# Patient Record
Sex: FEMALE | Race: WHITE | HISPANIC OR LATINO | Employment: UNEMPLOYED | URBAN - METROPOLITAN AREA
[De-identification: names, ages, dates, MRNs, and addresses within clinical notes are randomized per-mention and may not be internally consistent; named-entity substitution may affect disease eponyms.]

---

## 2018-01-10 NOTE — RESULT NOTES
Verified Results  * XR FOOT 3+ VIEW LEFT [id number] 11:57LG Kaycee Big Order Number: [id number]     Test Name Result Flag Reference   XR FOOT 3+ VW LEFT (Report)     LEFT FOOT     INDICATION: Hit left foot on bench 1 1/2 months ago  Pain in 5th toe radiating  COMPARISON: None     VIEWS: 3; 3 images     FINDINGS:     There is a transverse fracture of the distal aspect of the proximal phalanx of the 5th digit  There is some callus formation adjacent to the fracture, suggesting subacute  No degenerative changes  No lytic or blastic lesions are seen  Soft tissues are unremarkable  IMPRESSION:     Healing transverse fracture of the distal aspect of the proximal phalanx of the 5th digit  ##imslh##imslh       Workstation performed: [id number]     Signed by:   Haylee Snyder MD   7/1/16       Discussion/Summary   xray does show evidence of prior 5th toe fracture  I would refer to Dr Mariia Mercer for evaluation  She may need to be in a walking boot for several weeks if pain continues

## 2018-01-11 NOTE — RESULT NOTES
Verified Results  * US ABDOMEN COMPLETE [id number] 40:55HK Chayito Cagle Order Number: [id number]    - Patient Instructions: To schedule this appointment, please contact Central Scheduling at [phone]  Test Name Result Flag Reference   US ABDOMEN COMPLETE (Report)     ABDOMEN ULTRASOUND, COMPLETE     INDICATION: 62-year-old woman with history of lymphoma and generalized abdominal pain  COMPARISON: CT of the chest, abdomen and pelvis from July 31, 2015  TECHNIQUE: Real-time ultrasound of the abdomen was performed with a curvilinear transducer with both volumetric sweeps and still imaging techniques  FINDINGS:     PANCREAS: Normal with no mass or fluid collection  AORTA AND IVC: Normal for patient's age  No aortic aneurysm  Circumferential soft tissue, 2 2 cm in diameter, surrounding a patent superior mesenteric artery, similar in appearance to last year's CT and presumably residual, treated lymphoma  LIVER:   Size: Normal  The liver measures 14 5 cm in the midclavicular line  Contour: Surface contour smooth  Parenchyma: Echogenicity and echotexture within normal limits  No evidence of mass  Main portal vein patent with hepatopetal flow  GALLBLADDER: No calculi  No pericholecystic fluid    Gallbladder wall normal in thickness, measuring 2 mm  Sonographer reports no sonographic Kendall's sign  BILE DUCTS: CBD normal in caliber, measuring 5 mm  No intra-hepatic bile duct dilatation  KIDNEYS:    Right kidney measures 11 7 x 5 5 cm  Cortex normal in thickness and echogenicity  No masses  No calculus or hydronephrosis  Left kidney measures 10 3 x 5 2 cm  Cortex normal in thickness and echogenicity  No masses  No calculus or hydronephrosis  SPLEEN:    Normal, measuring 9 1 cm in length  Normal echogenicity  No mass or perisplenic collection  ASCITES: None         IMPRESSION:     Soft tissue structure surrounding the proximal portion of the SMA, most likely treated lymphoma, unchanged since a CT the abdomen and pelvis from July 31, 2015  Otherwise normal complete abdominal sonogram        Workstation performed: [id number]     Signed by:   Anshul Mcduffie MD   8/15/16     * US PELVIS COMPLETE [facility] AND TRANSVAGINAL) [id number] 43:75WM [facility] Order Number: [id number]    - Patient Instructions: To schedule this appointment, please contact Central Scheduling at [phone]  Test Name Result Flag Reference   US PELVIS COMPLETE (TRANSABDOMINAL AND TRANSVAGINAL) (Report)     PELVIC ULTRASOUND, COMPLETE     INDICATION: 61-year-old postmenopausal woman with generalized abdominal pain and history of lymphoma  COMPARISON: CT chest, abdomen and pelvis from July 31, 2015  TECHNIQUE:  Transabdominal pelvic ultrasound was performed in sagittal and transverse planes with a curvilinear transducer  Additional transvaginal imaging was performed to better evaluate the endometrium and ovaries  Imaging included volumetric    sweeps as well as traditional still imaging technique  FINDINGS:     UTERUS:   Position: Anteverted and anteflexed  Size: 8 3 x 3 1 x 4 1 cm  Echotexture: Normal contour and echogenicity  8 x 6 mm hypoechoic round avascular structure at the junction of the lower uterine segment and cervix, either an submucosal leiomyoma or a nabothian cyst with debris within it  Cervix: As above  Otherwise normal      ENDOMETRIUM:    Thickness: Normal, measuring 3 mm in maximal thickness  Echotexture: Normal and homogenous in echogenicity with no evidence of endometrial mass or fluid collection  OVARIES/ADNEXA:   Right ovary: 2 3 x 0 9 x 1 5 cm  No suspicious right ovarian abnormality  Doppler flow within normal limits  Left ovary: 1 2 x 2 2 x 1 0 cm  No suspicious left ovarian abnormality  Doppler flow within normal limits  No suspicious adnexal mass  Free fluid: None  IMPRESSION:      6 x 8 mm hypoechoic structure at the junction of the lower uterine segment and cervix, either a small submucosal leiomyoma or a prominent debris-containing nabothian cyst      Otherwise normal pelvic sonogram           Workstation performed: [id number]     Signed by:   Fernandez Mayers MD   8/15/16       Discussion/Summary   No suspicious findings on recent abdominal and pelvic ultrasound

## 2018-01-11 NOTE — MISCELLANEOUS
Message  patient called  wants CT ordered since PET scan denied by insurance  d/w dr Jn Huff and CT neck and chest ordered      Active Problems    1  Abdominal pain (789 00) (R10 9)   2  Acute Tear Of Left Rotator Cuff Tendon (726 19)   3  Aftercare following surgery for injury or trauma (V58 43) (Z48 89)   4  Anxiety (300 00) (F41 9)   5  Complete tear of left rotator cuff (727 61) (M75 122)   6  Encounter for routine gynecological examination (V72 31) (Z01 419)   7  Fatigue (780 79) (R53 83)   8  Foot pain (729 5) (M79 673)   9  Gastroenteritis (558 9) (K52 9)   10  Headache (784 0) (R51)   11  Hyperlipidemia (272 4) (E78 5)   12  Hypertension (401 9) (I10)   13  Impaired fasting glucose (790 21) (R73 01)   14  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)   15  Nausea with vomiting (787 01) (R11 2)   16  Neck pain (723 1) (M54 2)   17  Paresthesia (782 0) (R20 2)   18  Pleural Effusion (511 9)   19  Shoulder joint pain, unspecified laterality   20  Skin lesion (709 9) (L98 9)   21  Tinea corporis (110 5) (B35 4)   22  Visit for pre-operative examination (V72 84) (Z01 818)   23  Visit For: Routine Eye Exam (V72 0)   24  Vulvar Itching Or Burning (625 8)    Current Meds   1  Fish Oil 1000 MG Oral Capsule; Take 1 caps BID; Therapy: [id number] to Recorded   2  Multi-Vitamin Oral Tablet; TAKE 1 TABLET DAILY; Therapy: (Recorded:29Jun2016) to Recorded   3  OneTouch Test STRP; TEST ONCE DAILY; Therapy: (Mary Jane Richard) to Recorded   4  OneTouch UltraSoft Lancets Miscellaneous; TEST BLOOD SUGAR ONCE DAILY; Therapy: (Recorded:28Jan2014) to Recorded    Allergies    1  Corticosteroids    Plan  Lymphoma of lymph nodes of multiple sites    · CT NECK AND CHEST W CONTRAST; Status:Need Information - Financial  Authorization,Retrospective By Protocol Authorization;  Requested for:[id number];     Signatures   Electronically signed by : Marcelo Sterling, ; Sep 22 2016  2:06PM EST                       (Author)

## 2018-01-11 NOTE — MISCELLANEOUS
Message  pet scan denied by insurance  Dr Nicolás Rivera did a peer to peer today  appeal denied  left message to discuss with patient      Active Problems    1  Abdominal pain (789 00) (R10 9)   2  Acute Tear Of Left Rotator Cuff Tendon (726 19)   3  Aftercare following surgery for injury or trauma (V58 43) (Z48 89)   4  Anxiety (300 00) (F41 9)   5  Complete tear of left rotator cuff (727 61) (M75 122)   6  Encounter for routine gynecological examination (V72 31) (Z01 419)   7  Fatigue (780 79) (R53 83)   8  Foot pain (729 5) (M79 673)   9  Gastroenteritis (558 9) (K52 9)   10  Headache (784 0) (R51)   11  Hyperlipidemia (272 4) (E78 5)   12  Hypertension (401 9) (I10)   13  Impaired fasting glucose (790 21) (R73 01)   14  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)   15  Nausea with vomiting (787 01) (R11 2)   16  Neck pain (723 1) (M54 2)   17  Paresthesia (782 0) (R20 2)   18  Pleural Effusion (511 9)   19  Shoulder joint pain, unspecified laterality   20  Skin lesion (709 9) (L98 9)   21  Tinea corporis (110 5) (B35 4)   22  Visit for pre-operative examination (V72 84) (Z01 818)   23  Visit For: Routine Eye Exam (V72 0)   24  Vulvar Itching Or Burning (625 8)    Current Meds   1  Fish Oil 1000 MG Oral Capsule; Take 1 caps BID; Therapy: 15WMH3453 to Recorded   2  Multi-Vitamin Oral Tablet; TAKE 1 TABLET DAILY; Therapy: (Recorded:29Jun2016) to Recorded   3  OneTouch Test STRP; TEST ONCE DAILY; Therapy: (Author Rachel) to Recorded   4  OneTouch UltraSoft Lancets Miscellaneous; TEST BLOOD SUGAR ONCE DAILY; Therapy: (Recorded:28Jan2014) to Recorded    Allergies    1   Corticosteroids    Signatures   Electronically signed by : Viviana Underwood, ; Sep 15 2016 10:33AM EST                       (Author)

## 2018-01-12 NOTE — PROGRESS NOTES
Assessment    1  Foot pain (729 5) (M79 673)   2  Fatigue (780 79) (R53 83)   3  Hypertension (401 9) (I10)   4  Impaired fasting glucose (790 21) (R73 01)   5  Paresthesia (782 0) (R20 2)    Plan  Fatigue, Hypertension, Impaired fasting glucose    · (1) COMPREHENSIVE METABOLIC PANEL; Status:Active; Requested XPI:[id number];    · (1) HEMOGLOBIN A1C; Status:Active; Requested VEY:24BMP8577;    · (1) LIPID PANEL, FASTING; Status:Active; Requested TPU:[id number];    · (1) TSH; Status:Active; Requested DLS:38KTK3096;    · (1) VITAMIN B12; Status:Active; Requested PYX:[id number];    · (1) VITAMIN D 25-HYDROXY; Status:Active; Requested YXD:[id number]; Foot pain    · * XR FOOT 3+ VIEW LEFT; Status:Active; Requested GBS:03NOY3852; Health Maintenance    · * MAMMO SCREENING BILATERAL W CAD; Status:Hold For - Scheduling; Requested  IMZ:[id number];    · *VB-Depression Screening; Status:Resulted - Requires Verification,Retrospective By  Protocol Authorization;   Done: 22NIA2686 09:49AM   · Urine Dip Non-Automated- POC; Status:Resulted - Requires Verification,Retrospective  By Protocol Authorization;   Done: 88GVX3858 09:21AM    Discussion/Summary    Paresthesias of feet  Patient will obtain blood work as ordered to rule out other etiologies of her symptoms  She refused any treatment at this time such as gabapentin  Left toe pain  Patient will obtain x-ray of left foot for further evaluation  Health maintenance  Patient will schedule for colonoscopy, mammogram, and Pap smear at this year  Chief Complaint  Patient is here for an annual wellness exam  Patient c/o an ongoing issue with foot pain which is not improving  All medications were reviewed and updated with the patient  Patient does exercise on a regular basis  History of Present Illness  HPI: Patient reports multiple symptoms such as bilateral foot burning and hot sensation primarily at bedtime  She denies any swelling or rash on her feet   Symptoms have been present for several months  She states she has a very strong family history of diabetes  Patient had a mole on her right side of her face that she wishes to have evaluated  I reviewed her screening tests and patient is due to for her 5 year colonoscopy at this time as well has her mammogram, and Pap smears  Patient also reports having a left toe contusion while vacationing in [facility] last year  Patient reports still having discomfort with left fifth toe with wearing sneakers or running although she is able to use an elliptical machine without discomfort  Review of Systems    Constitutional: No fever, no chills, feels well, no tiredness, no recent weight gain or weight loss  Eyes: Decreased visual acuity especially with distance vision  ENT: no complaints of earache, no loss of hearing, no nose bleeds, no nasal discharge, no sore throat, no hoarseness  Cardiovascular: No complaints of slow heart rate, no fast heart rate, no chest pain, no palpitations, no leg claudication, no lower extremity edema  Respiratory: No complaints of shortness of breath, no wheezing, no cough, no SOB on exertion, no orthopnea, no PND  Gastrointestinal: No complaints of abdominal pain, no constipation, no nausea or vomiting, no diarrhea, no bloody stools  Genitourinary: No complaints of dysuria, no incontinence, no pelvic pain, no dysmenorrhea, no vaginal discharge or bleeding  Musculoskeletal: as noted in HPI  Integumentary: No complaints of skin rash or lesions, no itching, no skin wounds, no breast pain or lump  Neurological: as noted in HPI  Active Problems    1  Abdominal pain (789 00) (R10 9)   2  Acute Tear Of Left Rotator Cuff Tendon (726 19)   3  Aftercare following surgery for injury or trauma (V58 43) (Z48 89)   4  Anxiety (300 00) (F41 9)   5  Complete tear of left rotator cuff (727 61) (M75 122)   6  Encounter for routine gynecological examination (V72 31) (Z01 419)   7   Fatigue (780 79) (R53 83)   8  Headache (784 0) (R51)   9  Hyperlipidemia (272 4) (E78 5)   10  Hypertension (401 9) (I10)   11  Impaired fasting glucose (790 21) (R73 01)   12  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)   13  Neck pain (723 1) (M54 2)   14  Pleural Effusion (511 9)   15  Shoulder joint pain, unspecified laterality   16  Skin lesion (709 9) (L98 9)   17  Tinea corporis (110 5) (B35 4)   18  Visit for pre-operative examination (V72 84) (Z01 818)   19  Visit For: Routine Eye Exam (V72 0)   20  Vulvar Itching Or Burning (625 8)    Past Medical History    · Aftercare following surgery for injury or trauma (V58 43) (Z48 89)   · History of Age At First Period 17 Years Old (Menarche)   · History of Asymptomatic menopausal state (V49 81) (Z78 0)   · History of fatigue (V13 89) (Z87 898)   · History of insomnia (V13 89) (Z87 898)   · History of Lung mass (786 6) (R91 8)   · History of Osteoporosis (733 00) (M81 0)   · History of Previously Pregnant With 2  Normal Vaginal Deliveries    Surgical History    · History of Complete Colonoscopy    Family History  Mother    · Family history of Heart Disease (V17 49)   · Family history of Hypertension (V17 49)   · Family history of Thyroid Disorder (V18 19)  Father    · Family history of Colon Cancer (V16 0)   · Family history of Heart Disease (V17 49)   · Family history of Prostate Cancer (V16 39)  Brother    · Family history of Diabetes Mellitus (V18 0)   · Family history of Diabetes Mellitus (V18 0)   · Family history of Hypertension (V17 49)   · Family history of Hypertension (V17 49)    Social History    · Daily Coffee Consumption (1  Cups/Day)   · Denied: History of Drug Use   · Exercising Regularly   · Never A Smoker   · Never Drank Alcohol   · Sexually Active    Current Meds   1  Fish Oil 1000 MG Oral Capsule; Take 1 caps BID; Therapy: 63SQU5788 to Recorded   2  Multi-Vitamin Oral Tablet; TAKE 1 TABLET DAILY;    Therapy: (Recorded:29Jun2016) to Recorded 3  OneTouch Test STRP; TEST ONCE DAILY; Therapy: (Cody Banks) to Recorded   4  OneTouch UltraSoft Lancets Miscellaneous; TEST BLOOD SUGAR ONCE DAILY; Therapy: (Recorded:28Jan2014) to Recorded    Allergies    1  Corticosteroids    Vitals   Recorded: 45IKE7039 08:57AM   Temperature 96 2 F   Heart Rate 66   Respiration 16   Systolic 499   Diastolic 88   Height 5 ft 4 5 in   Weight 124 lb 6 08 oz   BMI Calculated 21 02   BSA Calculated 1 61     Physical Exam    Constitutional   General appearance: No acute distress, well appearing and well nourished  Ears, Nose, Mouth, and Throat   External inspection of ears and nose: Normal     Otoscopic examination: Tympanic membranes translucent with normal light reflex  Canals patent without erythema  Hearing: Normal     Oropharynx: Normal with no erythema, edema, exudate or lesions  Pulmonary   Respiratory effort: No increased work of breathing or signs of respiratory distress  Auscultation of lungs: Clear to auscultation  Cardiovascular   Auscultation of heart: Normal rate and rhythm, normal S1 and S2, no murmurs  Carotid pulses: 2+ bilaterally  Examination of extremities for edema and/or varicosities: Normal     Abdomen   Abdomen: Non-tender, no masses  Liver and spleen: No hepatomegaly or splenomegaly  Lymphatic   Palpation of lymph nodes in neck: No lymphadenopathy  Musculoskeletal   Gait and station: Normal     Digits and nails: Abnormal   Fifth toe on left foot has some chronic redness with a corn  No specific tenderness to palpation  Dorsalis pedis and posterior tibialis +2 bilaterally  Minimal trace edema around the ankle area        Results/Data  *VB-Depression Screening 79HEO8668 05:73ML Alvira Links     Test Name Result Flag Reference   Depression Scale Result      Depression Screen - Negative For Symptoms                       Urine Dip Non-Automated- POC 53BEB5528 47:73DY Alvira Links     Test Name Result Flag Reference Color Yellow     Clarity Transparent     Leukocytes negative     Nitrite negative     Blood negative     Bilirubin negative     Urobilinogen 0 2     Protein negative     Ph 5 0     Specific Gravity 1 025     Ketone negative     Glucose negative     Color Yellow     Clarity Transparent     Leukocytes negative     Nitrite negative     Blood negative     Bilirubin negative     Urobilinogen 0 2     Protein negative     Ph 5 0     Specific Gravity 1 025     Ketone negative     Glucose negative               Future Appointments    Date/Time Provider Specialty Site   09/09/2016 01:15 PM MELO Curtis   Hematology Oncology [facility] MEDICAL ONCOLOGY     Signatures   Electronically signed by : MELO Hemphill ; Jun 29 5563 10:39AM EST                       (Author)

## 2018-01-13 NOTE — MISCELLANEOUS
Message   Recorded as Task   Date: 09/27/2016 09:24 AM, Created By: Amador Patel   Task Name: Care Coordination   Assigned To: Amador Patel   Regarding Patient: Argenis Huff, Status: Active   Comment:    Amador Patel - 27 Sep 2016 9:24 AM     TASK CREATED  please add on CT abd pelvis to CT neck chest scheduled for tomorrow(approved by insurance)   Thanks! patient call back [phone]   Hima Roth - 27 Sep 2016 9:50 AM     TASK REPLIED TO: Previously Assigned To Suly Caruso for patient stating abd/pelvis was added to order  Additionally she will need to  the 2 bottles of oral contrast  I requested she call with any questions   AlonzoMaddison - 27 Sep 2016 10:04 AM     TASK EDITED        Active Problems    1  Abdominal pain (789 00) (R10 9)   2  Acute Tear Of Left Rotator Cuff Tendon (726 19)   3  Aftercare following surgery for injury or trauma (V58 43) (Z48 89)   4  Anxiety (300 00) (F41 9)   5  Complete tear of left rotator cuff (727 61) (M75 122)   6  Encounter for routine gynecological examination (V72 31) (Z01 419)   7  Fatigue (780 79) (R53 83)   8  Foot pain (729 5) (M79 673)   9  Gastroenteritis (558 9) (K52 9)   10  Headache (784 0) (R51)   11  Hyperlipidemia (272 4) (E78 5)   12  Hypertension (401 9) (I10)   13  Impaired fasting glucose (790 21) (R73 01)   14  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)   15  Nausea with vomiting (787 01) (R11 2)   16  Neck pain (723 1) (M54 2)   17  Paresthesia (782 0) (R20 2)   18  Pleural Effusion (511 9)   19  Shoulder joint pain, unspecified laterality   20  Skin lesion (709 9) (L98 9)   21  Tinea corporis (110 5) (B35 4)   22  Visit for pre-operative examination (V72 84) (Z01 818)   23  Visit For: Routine Eye Exam (V72 0)   24  Vulvar Itching Or Burning (625 8)    Current Meds   1  Fish Oil 1000 MG Oral Capsule; Take 1 caps BID; Therapy: 34MUV0287 to Recorded   2  Multi-Vitamin Oral Tablet; TAKE 1 TABLET DAILY;    Therapy: (Recorded:29Jun2016) to Recorded   3  OneTouch Test STRP; TEST ONCE DAILY; Therapy: (Orly Bryan) to Recorded   4  OneTouch UltraSoft Lancets Miscellaneous; TEST BLOOD SUGAR ONCE DAILY; Therapy: (Recorded:28Jan2014) to Recorded    Allergies    1   Corticosteroids    Signatures   Electronically signed by : Dayna Dunlap, ; Sep 27 2016 10:04AM EST                       (Author)

## 2018-01-16 NOTE — RESULT NOTES
Verified Results  (1) LIPID PANEL, FASTING 72ZUP1622 44:92KB Toi Model, Kristopher     Test Name Result Flag Reference   CHOLESTEROL, TOTAL 216 mg/dL H 125-200   HDL CHOLESTEROL 82 mg/dL  > OR = 46   TRIGLICERIDES 77 mg/dL  <095   LDL-CHOLESTEROL 119 mg/dL (calc)  <130   Desirable range <100 mg/dL for patients with CHD or  diabetes and <70 mg/dL for diabetic patients with  known heart disease  CHOL/HDLC RATIO 2 6 (calc)  < OR = 5 0   NON HDL CHOLESTEROL 134 mg/dL (calc)     Target for non-HDL cholesterol is 30 mg/dL higher than   LDL cholesterol target  (1) COMPREHENSIVE METABOLIC PANEL 60RFQ2850 32:03IP [employer]     Test Name Result Flag Reference   GLUCOSE 106 mg/dL H 65-99   Fasting reference interval   UREA NITROGEN (BUN) 10 mg/dL  7-25   CREATININE 0 58 mg/dL  0 50-1 05   For patients >52years of age, the reference limit  for Creatinine is approximately 13% higher for people  identified as -American  eGFR NON-AFR   AMERICAN 101 mL/min/1 73m2  > OR = 60   eGFR AFRICAN AMERICAN 117 mL/min/1 73m2  > OR = 60   BUN/CREATININE RATIO   0-31   NOT APPLICABLE (calc)   SODIUM 136 mmol/L  135-146   POTASSIUM 4 3 mmol/L  3 5-5 3   CHLORIDE 104 mmol/L     CARBON DIOXIDE 25 mmol/L  19-30   CALCIUM 9 5 mg/dL  8 6-10 4   PROTEIN, TOTAL 6 8 g/dL  6 1-8 1   ALBUMIN 4 3 g/dL  3 6-5 1   GLOBULIN 2 5 g/dL (calc)  1 9-3 7   ALBUMIN/GLOBULIN RATIO 1 7 (calc)  1 0-2 5   BILIRUBIN, TOTAL 1 0 mg/dL  0 2-1 2   ALKALINE PHOSPHATASE 110 U/L     AST 24 U/L  10-35   ALT 20 U/L  6-29     (Q) TSH, 3RD GENERATION [id number] 44:12FL [employer]     Test Name Result Flag Reference   TSH 1 25 mIU/L  0 40-4 50     (1) VITAMIN B12 26WHP9387 86:72JL Toi Model, Kristopher     Test Name Result Flag Reference   VITAMIN B12 1059 pg/mL  200-1100     *(Q) VITAMIN D, 25-HYDROXY, LC/MS/MS 13MVA1160 78:47ZU Toi Model, Kristopher     Test Name Result Flag Reference   VITAMIN D, 25-OH, TOTAL 66 ng/mL     Vitamin D Status         25-OH Vitamin D:     Deficiency:                    <20 ng/mL  Insufficiency:             20 - 29 ng/mL  Optimal:                 > or = 30 ng/mL     For 25-OH Vitamin D testing on patients on   D2-supplementation and patients for whom quantitation   of D2 and D3 fractions is required, the QuestAssureD(TM)  25-OH VIT D, (D2,D3), LC/MS/MS is recommended: order   code 72969 (patients >2yrs)  For more information on this test, go to:  http://[employer]/faq/MFC630  (This link is being provided for   informational/educational purposes only )     (Q) HEMOGLOBIN A1c 62GFJ2175 98:58NZ Kristopher Pham     Test Name Result Flag Reference   HEMOGLOBIN A1c 5 7 % of total Hgb H <5 7   According to ADA guidelines, hemoglobin A1c <7 0%  represents optimal control in non-pregnant diabetic  patients  Different metrics may apply to specific  patient populations  Standards of Medical Care in    Diabetes Care  2013;36:s11-s66     For the purpose of screening for the presence of  diabetes  <5 7%       Consistent with the absence of diabetes  5 7-6 4%    Consistent with increased risk for diabetes              (prediabetes)  >or=6 5%    Consistent with diabetes     This assay result is consistent with an increased risk  of diabetes  Currently, no consensus exists for use of hemoglobin  A1c for diagnosis of diabetes for children  Discussion/Summary   all recent bw was ok for now  she may p/u copy for her records

## 2018-01-16 NOTE — MISCELLANEOUS
Message  CALLED PT TO [facility]/S MISSED APPT  AND LEFT MESSAGE ON VOICEMAIL AND EMERGENCY CONTACT  Active Problems    1  Abdominal pain (789 00) (R10 9)   2  Acute Tear Of Left Rotator Cuff Tendon (726 19)   3  Aftercare following surgery for injury or trauma (V58 43) (Z48 89)   4  Anxiety (300 00) (F41 9)   5  Complete tear of left rotator cuff (727 61) (M75 122)   6  Encounter for routine gynecological examination (V72 31) (Z01 419)   7  Fatigue (780 79) (R53 83)   8  Headache (784 0) (R51)   9  Hyperlipidemia (272 4) (E78 5)   10  Hypertension (401 9) (I10)   11  Impaired fasting glucose (790 21) (R73 01)   12  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)   13  Neck pain (723 1) (M54 2)   14  Pleural Effusion (511 9)   15  Shoulder joint pain, unspecified laterality (719 41) (M25 519)   16  Skin lesion (709 9) (L98 9)   17  Tinea corporis (110 5) (B35 4)   18  Visit for pre-operative examination (V72 84) (Z01 818)   19  Visit For: Routine Eye Exam (V72 0)   20  Vulvar Itching Or Burning (625 8)    Current Meds   1  Fish Oil 1000 MG Oral Capsule; Take 1 caps BID; Therapy: [id number] to Recorded   2  Multi-Vitamin Oral Tablet; Therapy: (Recorded:14Nov2012) to Recorded   3  OneTouch Test STRP; TEST ONCE DAILY; Therapy: (Carin Adjutant) to Recorded   4  OneTouch UltraSoft Lancets Miscellaneous; TEST BLOOD SUGAR ONCE DAILY; Therapy: (Recorded:28Jan2014) to Recorded    Allergies    1  Corticosteroids    Signatures   Electronically signed by :  Mick Hatchet, ; Feb  3 2016  4:24PM EST                       (Author)

## 2018-01-16 NOTE — MISCELLANEOUS
Message  CT results reviewed by Dr Britney Gunderson reviewed results with patient  patient is moving to [facility], she will see an oncologist there  and pursue treatment of she experiences symptoms per Dr Charlie Wild    1  Abdominal pain (789 00) (R10 9)   2  Acute Tear Of Left Rotator Cuff Tendon (726 19)   3  Aftercare following surgery for injury or trauma (V58 43) (Z48 89)   4  Anxiety (300 00) (F41 9)   5  Complete tear of left rotator cuff (727 61) (M75 122)   6  Encounter for routine gynecological examination (V72 31) (Z01 419)   7  Fatigue (780 79) (R53 83)   8  Foot pain (729 5) (M79 673)   9  Gastroenteritis (558 9) (K52 9)   10  Headache (784 0) (R51)   11  Hyperlipidemia (272 4) (E78 5)   12  Hypertension (401 9) (I10)   13  Impaired fasting glucose (790 21) (R73 01)   14  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)   15  Nausea with vomiting (787 01) (R11 2)   16  Neck pain (723 1) (M54 2)   17  Paresthesia (782 0) (R20 2)   18  Pleural Effusion (511 9)   19  Shoulder joint pain, unspecified laterality   20  Skin lesion (709 9) (L98 9)   21  Tinea corporis (110 5) (B35 4)   22  Visit for pre-operative examination (V72 84) (Z01 818)   23  Visit For: Routine Eye Exam (V72 0)   24  Vulvar Itching Or Burning (625 8)    Current Meds   1  Fish Oil 1000 MG Oral Capsule; Take 1 caps BID; Therapy: [id number] to Recorded   2  Multi-Vitamin Oral Tablet; TAKE 1 TABLET DAILY; Therapy: (Recorded:29Jun2016) to Recorded   3  OneTouch Test STRP; TEST ONCE DAILY; Therapy: ([id number]) to Recorded   4  OneTouch UltraSoft Lancets Miscellaneous; TEST BLOOD SUGAR ONCE DAILY; Therapy: (Recorded:28Jan2014) to Recorded    Allergies    1   Corticosteroids    Signatures   Electronically signed by : Sue Moseley, ; Sep 29 2016 10:02AM EST                       (Author)

## 2019-10-07 ENCOUNTER — TELEPHONE (OUTPATIENT)
Dept: HEMATOLOGY ONCOLOGY | Facility: CLINIC | Age: 64
End: 2019-10-07

## 2019-10-07 NOTE — TELEPHONE ENCOUNTER
Maryjane from [facility] called in  requested records for patient    A good call back number for Maryjane is [phone] fax [phone]